# Patient Record
Sex: FEMALE | Race: OTHER
[De-identification: names, ages, dates, MRNs, and addresses within clinical notes are randomized per-mention and may not be internally consistent; named-entity substitution may affect disease eponyms.]

---

## 2023-05-15 ENCOUNTER — OFFICE VISIT (OUTPATIENT)
Dept: FAMILY MEDICINE CLINIC | Facility: CLINIC | Age: 28
End: 2023-05-15
Payer: COMMERCIAL

## 2023-05-15 VITALS
WEIGHT: 293 LBS | DIASTOLIC BLOOD PRESSURE: 84 MMHG | HEIGHT: 66 IN | BODY MASS INDEX: 47.09 KG/M2 | RESPIRATION RATE: 16 BRPM | SYSTOLIC BLOOD PRESSURE: 122 MMHG

## 2023-05-15 DIAGNOSIS — B00.9 HERPES: ICD-10-CM

## 2023-05-15 DIAGNOSIS — L73.2 HIDRADENITIS SUPPURATIVA: Primary | ICD-10-CM

## 2023-05-15 DIAGNOSIS — Z11.59 NEED FOR HEPATITIS C SCREENING TEST: ICD-10-CM

## 2023-05-15 DIAGNOSIS — M54.50 CHRONIC BILATERAL LOW BACK PAIN WITHOUT SCIATICA: ICD-10-CM

## 2023-05-15 DIAGNOSIS — G89.29 CHRONIC BILATERAL LOW BACK PAIN WITHOUT SCIATICA: ICD-10-CM

## 2023-05-15 DIAGNOSIS — Z72.51 HIGH RISK HETEROSEXUAL BEHAVIOR: ICD-10-CM

## 2023-05-15 DIAGNOSIS — Z13.220 SCREENING CHOLESTEROL LEVEL: ICD-10-CM

## 2023-05-15 DIAGNOSIS — N95.9 PREMENOPAUSAL PATIENT: ICD-10-CM

## 2023-05-15 DIAGNOSIS — M99.05 SOMATIC DYSFUNCTION OF PELVIC REGION: ICD-10-CM

## 2023-05-15 DIAGNOSIS — Z13.1 SCREENING FOR DIABETES MELLITUS: ICD-10-CM

## 2023-05-15 DIAGNOSIS — M99.04 SOMATIC DYSFUNCTION OF SACRAL REGION: ICD-10-CM

## 2023-05-15 LAB
BASOPHILS # BLD: 0.1 K/UL (ref 0–0.2)
BASOPHILS NFR BLD: 0 % (ref 0–2)
DIFFERENTIAL METHOD BLD: ABNORMAL
EOSINOPHIL # BLD: 0.2 K/UL (ref 0–0.8)
EOSINOPHIL NFR BLD: 1 % (ref 0.5–7.8)
ERYTHROCYTE [DISTWIDTH] IN BLOOD BY AUTOMATED COUNT: 14.8 % (ref 11.9–14.6)
HCT VFR BLD AUTO: 36.6 % (ref 35.8–46.3)
HGB BLD-MCNC: 11 G/DL (ref 11.7–15.4)
IMM GRANULOCYTES # BLD AUTO: 0.1 K/UL (ref 0–0.5)
IMM GRANULOCYTES NFR BLD AUTO: 0 % (ref 0–5)
LYMPHOCYTES # BLD: 4.3 K/UL (ref 0.5–4.6)
LYMPHOCYTES NFR BLD: 31 % (ref 13–44)
MCH RBC QN AUTO: 25.2 PG (ref 26.1–32.9)
MCHC RBC AUTO-ENTMCNC: 30.1 G/DL (ref 31.4–35)
MCV RBC AUTO: 83.9 FL (ref 82–102)
MONOCYTES # BLD: 0.8 K/UL (ref 0.1–1.3)
MONOCYTES NFR BLD: 6 % (ref 4–12)
NEUTS SEG # BLD: 8.5 K/UL (ref 1.7–8.2)
NEUTS SEG NFR BLD: 62 % (ref 43–78)
NRBC # BLD: 0 K/UL (ref 0–0.2)
PLATELET # BLD AUTO: 375 K/UL (ref 150–450)
PMV BLD AUTO: 11.3 FL (ref 9.4–12.3)
RBC # BLD AUTO: 4.36 M/UL (ref 4.05–5.2)
WBC # BLD AUTO: 13.9 K/UL (ref 4.3–11.1)

## 2023-05-15 PROCEDURE — 99204 OFFICE O/P NEW MOD 45 MIN: CPT | Performed by: FAMILY MEDICINE

## 2023-05-15 PROCEDURE — 98925 OSTEOPATH MANJ 1-2 REGIONS: CPT | Performed by: FAMILY MEDICINE

## 2023-05-15 RX ORDER — CLINDAMYCIN PHOSPHATE 10 MG/G
GEL TOPICAL
Qty: 60 G | Refills: 1 | Status: SHIPPED | OUTPATIENT
Start: 2023-05-15 | End: 2023-05-22

## 2023-05-15 RX ORDER — VALACYCLOVIR HYDROCHLORIDE 500 MG/1
500 TABLET, FILM COATED ORAL DAILY
COMMUNITY
Start: 2023-04-16

## 2023-05-15 SDOH — ECONOMIC STABILITY: FOOD INSECURITY: WITHIN THE PAST 12 MONTHS, THE FOOD YOU BOUGHT JUST DIDN'T LAST AND YOU DIDN'T HAVE MONEY TO GET MORE.: NEVER TRUE

## 2023-05-15 SDOH — ECONOMIC STABILITY: INCOME INSECURITY: HOW HARD IS IT FOR YOU TO PAY FOR THE VERY BASICS LIKE FOOD, HOUSING, MEDICAL CARE, AND HEATING?: NOT HARD AT ALL

## 2023-05-15 SDOH — ECONOMIC STABILITY: HOUSING INSECURITY
IN THE LAST 12 MONTHS, WAS THERE A TIME WHEN YOU DID NOT HAVE A STEADY PLACE TO SLEEP OR SLEPT IN A SHELTER (INCLUDING NOW)?: NO

## 2023-05-15 SDOH — ECONOMIC STABILITY: FOOD INSECURITY: WITHIN THE PAST 12 MONTHS, YOU WORRIED THAT YOUR FOOD WOULD RUN OUT BEFORE YOU GOT MONEY TO BUY MORE.: NEVER TRUE

## 2023-05-15 ASSESSMENT — ENCOUNTER SYMPTOMS
NAUSEA: 0
DIARRHEA: 0
COUGH: 0
CONSTIPATION: 0
SHORTNESS OF BREATH: 0
ABDOMINAL PAIN: 0
VOMITING: 0

## 2023-05-15 ASSESSMENT — PATIENT HEALTH QUESTIONNAIRE - PHQ9
SUM OF ALL RESPONSES TO PHQ QUESTIONS 1-9: 0
1. LITTLE INTEREST OR PLEASURE IN DOING THINGS: 0
SUM OF ALL RESPONSES TO PHQ QUESTIONS 1-9: 0
2. FEELING DOWN, DEPRESSED OR HOPELESS: 0
SUM OF ALL RESPONSES TO PHQ9 QUESTIONS 1 & 2: 0
SUM OF ALL RESPONSES TO PHQ QUESTIONS 1-9: 0
SUM OF ALL RESPONSES TO PHQ QUESTIONS 1-9: 0

## 2023-05-15 NOTE — PROGRESS NOTES
PROGRESS NOTE    SUBJECTIVE:   Stanley Owens is a 33 y.o. female seen for a follow up visit regarding [unfilled]    34 y/o female here to establish care. She has Pmh of herpes. She would like checked for other STD's because of a questionable partner that she got the herpes from. She is due for a pap. She has abscesses on her breasts and under her arms since she was 13. Her back and hips have been hurting her. She sat in a chair for a long time and feels very \"uneven\". Past Medical History, Past Surgical History, Family history, Social History, and Medications were all reviewed with the patient today and updated as necessary. Current Outpatient Medications   Medication Sig Dispense Refill    valACYclovir (VALTREX) 500 MG tablet Take 1 tablet by mouth daily      metFORMIN (GLUCOPHAGE) 500 MG tablet Take 1 tablet by mouth 2 times daily (with meals) 180 tablet 1    clindamycin (CLEOCIN-T) 1 % gel Apply topically 2 times daily. 60 g 1     No current facility-administered medications for this visit. No Known Allergies  There is no problem list on file for this patient. Past Medical History:   Diagnosis Date    Herpes      Past Surgical History:   Procedure Laterality Date    BRAIN SURGERY       Social History     Tobacco Use    Smoking status: Former     Types: Cigarettes     Passive exposure: Never    Smokeless tobacco: Never   Substance Use Topics    Alcohol use: Never         Review of Systems   Constitutional:  Negative for chills, fatigue and fever. Respiratory:  Negative for cough and shortness of breath. Cardiovascular:  Negative for chest pain and palpitations. Gastrointestinal:  Negative for abdominal pain, constipation, diarrhea, nausea and vomiting. Musculoskeletal:         Low back and hip pain, right   Skin:         Abscesses on breasts and axillas. Neurological:  Negative for dizziness and headaches. Psychiatric/Behavioral:  Negative for sleep disturbance.  The patient is not

## 2023-05-16 LAB
ALBUMIN SERPL-MCNC: 3.6 G/DL (ref 3.5–5)
ALBUMIN/GLOB SERPL: 0.9 (ref 0.4–1.6)
ALP SERPL-CCNC: 107 U/L (ref 50–136)
ALT SERPL-CCNC: 16 U/L (ref 12–65)
ANION GAP SERPL CALC-SCNC: 3 MMOL/L (ref 2–11)
AST SERPL-CCNC: 9 U/L (ref 15–37)
BILIRUB SERPL-MCNC: 0.3 MG/DL (ref 0.2–1.1)
BUN SERPL-MCNC: 18 MG/DL (ref 6–23)
CALCIUM SERPL-MCNC: 9.4 MG/DL (ref 8.3–10.4)
CHLORIDE SERPL-SCNC: 105 MMOL/L (ref 101–110)
CHOLEST SERPL-MCNC: 208 MG/DL
CO2 SERPL-SCNC: 27 MMOL/L (ref 21–32)
CREAT SERPL-MCNC: 0.9 MG/DL (ref 0.6–1)
GLOBULIN SER CALC-MCNC: 3.9 G/DL (ref 2.8–4.5)
GLUCOSE SERPL-MCNC: 83 MG/DL (ref 65–100)
HCV RNA, QN (IU/ML): NORMAL IU/ML
HDLC SERPL-MCNC: 36 MG/DL (ref 40–60)
HDLC SERPL: 5.8
HIV 1+2 AB+HIV1 P24 AG SERPL QL IA: NONREACTIVE
HIV 1/2 RESULT COMMENT: NORMAL
LDLC SERPL CALC-MCNC: 117 MG/DL
POTASSIUM SERPL-SCNC: 4.4 MMOL/L (ref 3.5–5.1)
PROT SERPL-MCNC: 7.5 G/DL (ref 6.3–8.2)
SODIUM SERPL-SCNC: 135 MMOL/L (ref 133–143)
TEST INFORMATION: NORMAL
TRIGL SERPL-MCNC: 275 MG/DL (ref 35–150)
VLDLC SERPL CALC-MCNC: 55 MG/DL (ref 6–23)

## 2023-05-23 ENCOUNTER — OFFICE VISIT (OUTPATIENT)
Dept: FAMILY MEDICINE CLINIC | Facility: CLINIC | Age: 28
End: 2023-05-23
Payer: COMMERCIAL

## 2023-05-23 VITALS
SYSTOLIC BLOOD PRESSURE: 122 MMHG | HEIGHT: 66 IN | BODY MASS INDEX: 47.09 KG/M2 | WEIGHT: 293 LBS | DIASTOLIC BLOOD PRESSURE: 84 MMHG | RESPIRATION RATE: 16 BRPM

## 2023-05-23 DIAGNOSIS — A63.0 GENITAL WARTS: ICD-10-CM

## 2023-05-23 DIAGNOSIS — Z12.4 SCREENING FOR CERVICAL CANCER: Primary | ICD-10-CM

## 2023-05-23 DIAGNOSIS — Z72.51 HIGH RISK HETEROSEXUAL BEHAVIOR: ICD-10-CM

## 2023-05-23 PROCEDURE — 99395 PREV VISIT EST AGE 18-39: CPT | Performed by: FAMILY MEDICINE

## 2023-05-23 PROCEDURE — 99213 OFFICE O/P EST LOW 20 MIN: CPT | Performed by: FAMILY MEDICINE

## 2023-05-23 RX ORDER — IMIQUIMOD 12.5 MG/.25G
CREAM TOPICAL
Qty: 24 EACH | Refills: 1 | Status: SHIPPED | OUTPATIENT
Start: 2023-05-23 | End: 2023-05-30

## 2023-05-23 RX ORDER — CEPHALEXIN 500 MG/1
500 CAPSULE ORAL 3 TIMES DAILY
Qty: 21 CAPSULE | Refills: 0 | Status: SHIPPED | OUTPATIENT
Start: 2023-05-23

## 2023-05-23 ASSESSMENT — PATIENT HEALTH QUESTIONNAIRE - PHQ9
SUM OF ALL RESPONSES TO PHQ QUESTIONS 1-9: 0
1. LITTLE INTEREST OR PLEASURE IN DOING THINGS: 0
SUM OF ALL RESPONSES TO PHQ QUESTIONS 1-9: 0
SUM OF ALL RESPONSES TO PHQ QUESTIONS 1-9: 0
2. FEELING DOWN, DEPRESSED OR HOPELESS: 0
SUM OF ALL RESPONSES TO PHQ QUESTIONS 1-9: 0
SUM OF ALL RESPONSES TO PHQ9 QUESTIONS 1 & 2: 0

## 2023-05-23 NOTE — PROGRESS NOTES
Patient is a 33 y.o. female for annual routine Pap and checkup. HISTORY:    G 0 P 0 Ab 0 #SVDs 0 #C/S 0  Patient's last menstrual period was 05/15/2023 (exact date). Tobacco: none Alcohol:  none  Marital Status: single  Last Pap:  2021  Mammo:  N/A DEXA:  N/A Colonoscopy: N/A    There is no problem list on file for this patient. Past Medical History:   Diagnosis Date    Herpes      Past Surgical History:   Procedure Laterality Date    BRAIN SURGERY       No family history on file. No Known Allergies  Current Outpatient Medications on File Prior to Visit   Medication Sig Dispense Refill    valACYclovir (VALTREX) 500 MG tablet Take 1 tablet by mouth daily      metFORMIN (GLUCOPHAGE) 500 MG tablet Take 1 tablet by mouth 2 times daily (with meals) 180 tablet 1     No current facility-administered medications on file prior to visit. Sexual History:  not sexually active for 9 months, but previously had high risk sexual encounters  Contraception:  none    ROS:  Feeling well. No dyspnea or chest pain on exertion. No abdominal pain, change in bowel habits, black or bloody stools. No urinary tract symptoms. GYN ROS: normal menses, no abnormal bleeding, pelvic pain or discharge, no breast pain or new or enlarging lumps on self exam, she complains of infection on right breat. Warts on labia. No neurological complaints. PHYSICAL EXAM:  Blood pressure 122/84, resp. rate 16, height 5' 6\" (1.676 m), weight (!) 311 lb 6.4 oz (141.3 kg), last menstrual period 05/15/2023. The patient appears well, alert, oriented x 3, in no distress. Abdomen soft without tenderness, guarding, mass or organomegaly. Extremities show no edema, normal peripheral pulses. Neurological is normal, no focal findings. Breast[de-identified] symmetric fibrous changes bilaterally, one abscess on right breast    Pelvic:  normal, vulva, vagina, cervix, uterus and adnexa. Genital warts on right labia.      ASSESSMENT:  well woman  Encounter Diagnoses

## 2023-05-27 LAB
C TRACH RRNA SPEC QL NAA+PROBE: POSITIVE
N GONORRHOEA RRNA SPEC QL NAA+PROBE: NEGATIVE
SPECIMEN SOURCE: ABNORMAL
T VAGINALIS RRNA SPEC QL NAA+PROBE: NEGATIVE

## 2023-05-31 RX ORDER — DOXYCYCLINE HYCLATE 100 MG
100 TABLET ORAL 2 TIMES DAILY
Qty: 14 TABLET | Refills: 0 | Status: SHIPPED | OUTPATIENT
Start: 2023-05-31 | End: 2023-06-07

## 2023-06-02 LAB
CYTOLOGIST CVX/VAG CYTO: ABNORMAL
CYTOLOGY CVX/VAG DOC THIN PREP: ABNORMAL
HPV APTIMA: POSITIVE
HPV GENOTYPE REFLEX: ABNORMAL
Lab: ABNORMAL
PATH REPORT.FINAL DX SPEC: ABNORMAL
PATHOLOGIST CVX/VAG CYTO: ABNORMAL
PATHOLOGIST PROVIDED ICD: ABNORMAL
RECOM F/U CVX/VAG CYTO: ABNORMAL
STAT OF ADQ CVX/VAG CYTO-IMP: ABNORMAL

## 2023-06-08 DIAGNOSIS — R87.810 ASCUS WITH POSITIVE HIGH RISK HPV CERVICAL: Primary | ICD-10-CM

## 2023-06-08 DIAGNOSIS — R87.610 ASCUS WITH POSITIVE HIGH RISK HPV CERVICAL: Primary | ICD-10-CM

## 2024-05-13 ENCOUNTER — OFFICE VISIT (OUTPATIENT)
Dept: INTERNAL MEDICINE CLINIC | Facility: CLINIC | Age: 29
End: 2024-05-13
Payer: COMMERCIAL

## 2024-05-13 ENCOUNTER — TELEPHONE (OUTPATIENT)
Dept: INTERNAL MEDICINE CLINIC | Facility: CLINIC | Age: 29
End: 2024-05-13

## 2024-05-13 VITALS
HEART RATE: 95 BPM | TEMPERATURE: 97.7 F | HEIGHT: 66 IN | WEIGHT: 293 LBS | OXYGEN SATURATION: 100 % | SYSTOLIC BLOOD PRESSURE: 127 MMHG | BODY MASS INDEX: 47.09 KG/M2 | DIASTOLIC BLOOD PRESSURE: 78 MMHG

## 2024-05-13 DIAGNOSIS — N92.0 MENORRHAGIA WITH REGULAR CYCLE: ICD-10-CM

## 2024-05-13 DIAGNOSIS — Z86.19 HX OF HERPES GENITALIS: ICD-10-CM

## 2024-05-13 DIAGNOSIS — Z76.89 ESTABLISHING CARE WITH NEW DOCTOR, ENCOUNTER FOR: Primary | ICD-10-CM

## 2024-05-13 DIAGNOSIS — E66.01 CLASS 3 SEVERE OBESITY DUE TO EXCESS CALORIES WITH SERIOUS COMORBIDITY AND BODY MASS INDEX (BMI) OF 50.0 TO 59.9 IN ADULT (HCC): ICD-10-CM

## 2024-05-13 DIAGNOSIS — E78.2 MIXED HYPERLIPIDEMIA: ICD-10-CM

## 2024-05-13 DIAGNOSIS — E55.9 VITAMIN D DEFICIENCY: ICD-10-CM

## 2024-05-13 DIAGNOSIS — E61.1 IRON DEFICIENCY: ICD-10-CM

## 2024-05-13 DIAGNOSIS — E28.2 PCOS (POLYCYSTIC OVARIAN SYNDROME): ICD-10-CM

## 2024-05-13 DIAGNOSIS — D64.9 NORMOCYTIC ANEMIA: ICD-10-CM

## 2024-05-13 DIAGNOSIS — D50.9 NORMOCYTIC HYPOCHROMIC ANEMIA: ICD-10-CM

## 2024-05-13 PROBLEM — B00.9 HERPES SIMPLEX VIRUS (HSV) INFECTION: Status: ACTIVE | Noted: 2023-03-20

## 2024-05-13 PROBLEM — R03.0 ELEVATED BLOOD-PRESSURE READING, WITHOUT DIAGNOSIS OF HYPERTENSION: Status: ACTIVE | Noted: 2024-03-15

## 2024-05-13 PROBLEM — E66.813 CLASS 3 SEVERE OBESITY DUE TO EXCESS CALORIES WITH SERIOUS COMORBIDITY AND BODY MASS INDEX (BMI) OF 50.0 TO 59.9 IN ADULT: Status: ACTIVE | Noted: 2024-05-13

## 2024-05-13 PROBLEM — A60.00 RECURRENT GENITAL HERPES SIMPLEX: Status: ACTIVE | Noted: 2024-01-12

## 2024-05-13 LAB
25(OH)D3 SERPL-MCNC: 11.2 NG/ML (ref 30–100)
ALBUMIN SERPL-MCNC: 3.8 G/DL (ref 3.5–5)
ALBUMIN/GLOB SERPL: 1.1 (ref 1–1.9)
ALP SERPL-CCNC: 94 U/L (ref 35–104)
ALT SERPL-CCNC: 9 U/L (ref 12–65)
ANION GAP SERPL CALC-SCNC: 11 MMOL/L (ref 9–18)
AST SERPL-CCNC: 15 U/L (ref 15–37)
BILIRUB SERPL-MCNC: 0.2 MG/DL (ref 0–1.2)
BUN SERPL-MCNC: 24 MG/DL (ref 6–23)
CALCIUM SERPL-MCNC: 9.2 MG/DL (ref 8.8–10.2)
CHLORIDE SERPL-SCNC: 102 MMOL/L (ref 98–107)
CHOLEST SERPL-MCNC: 212 MG/DL (ref 0–200)
CO2 SERPL-SCNC: 25 MMOL/L (ref 20–28)
CREAT SERPL-MCNC: 0.72 MG/DL (ref 0.6–1.1)
ERYTHROCYTE [DISTWIDTH] IN BLOOD BY AUTOMATED COUNT: 14.5 % (ref 11.9–14.6)
EST. AVERAGE GLUCOSE BLD GHB EST-MCNC: 106 MG/DL
FERRITIN SERPL-MCNC: 19 NG/ML (ref 8–388)
GLOBULIN SER CALC-MCNC: 3.5 G/DL (ref 2.3–3.5)
GLUCOSE SERPL-MCNC: 87 MG/DL (ref 70–99)
HBA1C MFR BLD: 5.3 % (ref 0–5.6)
HCT VFR BLD AUTO: 35.3 % (ref 35.8–46.3)
HDLC SERPL-MCNC: 39 MG/DL (ref 40–60)
HDLC SERPL: 5.4 (ref 0–5)
HGB BLD-MCNC: 10.5 G/DL (ref 11.7–15.4)
IRON SERPL-MCNC: 29 UG/DL (ref 35–100)
LDLC SERPL CALC-MCNC: 132 MG/DL (ref 0–100)
MCH RBC QN AUTO: 24.6 PG (ref 26.1–32.9)
MCHC RBC AUTO-ENTMCNC: 29.7 G/DL (ref 31.4–35)
MCV RBC AUTO: 82.9 FL (ref 82–102)
NRBC # BLD: 0 K/UL (ref 0–0.2)
PLATELET # BLD AUTO: 344 K/UL (ref 150–450)
PMV BLD AUTO: 11.2 FL (ref 9.4–12.3)
POTASSIUM SERPL-SCNC: 4.1 MMOL/L (ref 3.5–5.1)
PROT SERPL-MCNC: 7.3 G/DL (ref 6.3–8.2)
RBC # BLD AUTO: 4.26 M/UL (ref 4.05–5.2)
SODIUM SERPL-SCNC: 138 MMOL/L (ref 136–145)
TRIGL SERPL-MCNC: 203 MG/DL (ref 0–150)
TSH W FREE THYROID IF ABNORMAL: 1.67 UIU/ML (ref 0.27–4.2)
VLDLC SERPL CALC-MCNC: 41 MG/DL (ref 6–23)
WBC # BLD AUTO: 13.6 K/UL (ref 4.3–11.1)

## 2024-05-13 PROCEDURE — G2211 COMPLEX E/M VISIT ADD ON: HCPCS | Performed by: INTERNAL MEDICINE

## 2024-05-13 PROCEDURE — 99214 OFFICE O/P EST MOD 30 MIN: CPT | Performed by: INTERNAL MEDICINE

## 2024-05-13 RX ORDER — VALACYCLOVIR HYDROCHLORIDE 1 G/1
1000 TABLET, FILM COATED ORAL DAILY
Qty: 90 TABLET | Refills: 1 | Status: SHIPPED | OUTPATIENT
Start: 2024-05-13 | End: 2024-05-13 | Stop reason: SDUPTHER

## 2024-05-13 RX ORDER — BUTYROSPERMUM PARKII(SHEA BUTTER), SIMMONDSIA CHINENSIS (JOJOBA) SEED OIL, ALOE BARBADENSIS LEAF EXTRACT .01; 1; 3.5 G/100G; G/100G; G/100G
5000 LIQUID TOPICAL DAILY
Qty: 90 CAPSULE | Refills: 5 | Status: SHIPPED | OUTPATIENT
Start: 2024-05-13 | End: 2024-05-13 | Stop reason: SDUPTHER

## 2024-05-13 ASSESSMENT — PATIENT HEALTH QUESTIONNAIRE - PHQ9
SUM OF ALL RESPONSES TO PHQ QUESTIONS 1-9: 0
1. LITTLE INTEREST OR PLEASURE IN DOING THINGS: NOT AT ALL
SUM OF ALL RESPONSES TO PHQ QUESTIONS 1-9: 0
2. FEELING DOWN, DEPRESSED OR HOPELESS: NOT AT ALL
SUM OF ALL RESPONSES TO PHQ QUESTIONS 1-9: 0
SUM OF ALL RESPONSES TO PHQ QUESTIONS 1-9: 0
SUM OF ALL RESPONSES TO PHQ9 QUESTIONS 1 & 2: 0

## 2024-05-13 NOTE — TELEPHONE ENCOUNTER
The pt called stating she was seen today by Dr. Rivera.  She notes he sent all of her Rxs to [facility], but they are out of network and her Rxs were going to cost her over a $1000.  She would like to see if these can be resent to [facility] [address].   Pts CB# [phone]

## 2024-05-13 NOTE — ACP (ADVANCE CARE PLANNING)
Advance Care Planning   The patient has the following advanced directives on file:  Advance Directives       Power of  Living Will ACP-Advance Directive ACP-Power of     Not on File Not on File Not on File Not on File            The patient has appointed the following active healthcare agents:    Primary Decision Maker: Sana Campbell - Brother/Sister - [phone]    The Patient has the following current code status:    Code Status: Not on file    Visit Documentation:  I discussed Advance Care Planning with Zaira Delgado today which included the importance of making their choices for care and treatment in the case of a health event that adversely affects their decision-making abilities. She has not completed the Advance Care Directives. She does not have an active health care agent at this time.  Zaira Delgado was encouraged to complete the declaration forms and provide a signed copy of her medical records.  I advised patient we would continue this discussion at future visits.     Falguni Walsh  5/13/2024

## 2024-05-13 NOTE — PROGRESS NOTES
Zaira Delgado (: 1995) is a 29 y.o. female, here for evaluation of the following chief complaint(s):  Established New Doctor (Pt is here to estab PCP care. Pt is transferring from Dr. Ramos.), Weight Management (Pt would like to discuss weight loss options.), and Herpes Zoster (Pt states she's been dx x 2 years and would like to discuss treatment.)       ASSESSMENT/PLAN:  1. Establishing care with new doctor, encounter for  2. Mixed hyperlipidemia  -     Comprehensive Metabolic Panel; Future  -     Lipid Panel; Future  3. Normocytic anemia  -     Comprehensive Metabolic Panel; Future  -     CBC; Future  -     Iron; Future  -     Ferritin; Future  4. Vitamin D deficiency  -     Vitamin D 25 Hydroxy; Future  -     vitamin D (D3 5000) 125 MCG (5000 UT) CAPS capsule; Take 1 capsule by mouth daily, Disp-90 capsule, R-5Normal  5. Class 3 severe obesity due to excess calories with serious comorbidity and body mass index (BMI) of 50.0 to 59.9 in adult (HCC)  -     TSH with Reflex; Future  -     Hemoglobin A1C; Future  -     [facility]  -     Tirzepatide-Weight Management 2.5 MG/0.5ML SOAJ; Inject 2.5 mg into the skin once a week, Disp-0.5 mL, R-1Normal  -     Tirzepatide-Weight Management 2.5 MG/0.5ML SOAJ; Inject 5 mg into the skin once a week, Disp-0.5 mL, R-1Normal  6. PCOS (polycystic ovarian syndrome)  Assessment & Plan:   Previously treated with metformin.  Orders:  -     [facility]  7. Hx of herpes genitalis  Assessment & Plan:   1 outbreak last year. Triggered with increased stress. Not responding well to Valtrex 500 or 1 g dosing. Outbreaks are lasting weeks at a time.   Orders:  -     [facility]  -     valACYclovir (VALTREX) 1 g tablet; Take 1 tablet by mouth daily, Disp-90 tablet, R-1Normal  8. Menorrhagia with regular cycle             SUBJECTIVE/OBJECTIVE:  HPI: Patient is a pleasant 29-year-old

## 2024-05-13 NOTE — ASSESSMENT & PLAN NOTE
1 outbreak last year. Triggered with increased stress. Not responding well to Valtrex 500 or 1 g dosing. Outbreaks are lasting weeks at a time.

## 2024-05-14 RX ORDER — BUTYROSPERMUM PARKII(SHEA BUTTER), SIMMONDSIA CHINENSIS (JOJOBA) SEED OIL, ALOE BARBADENSIS LEAF EXTRACT .01; 1; 3.5 G/100G; G/100G; G/100G
5000 LIQUID TOPICAL DAILY
Qty: 90 CAPSULE | Refills: 5 | Status: SHIPPED | OUTPATIENT
Start: 2024-05-14

## 2024-05-14 RX ORDER — FERROUS SULFATE 325(65) MG
325 TABLET ORAL EVERY OTHER DAY
Qty: 90 TABLET | Refills: 1 | Status: SHIPPED | OUTPATIENT
Start: 2024-05-14

## 2024-05-14 RX ORDER — VALACYCLOVIR HYDROCHLORIDE 1 G/1
1000 TABLET, FILM COATED ORAL DAILY
Qty: 90 TABLET | Refills: 1 | Status: SHIPPED | OUTPATIENT
Start: 2024-05-14

## 2024-05-14 RX ORDER — TIRZEPATIDE 5 MG/.5ML
0.5 INJECTION, SOLUTION SUBCUTANEOUS
Qty: 0.5 ML | Refills: 2 | Status: SHIPPED | OUTPATIENT
Start: 2024-05-14 | End: 2024-06-13

## 2024-12-31 SDOH — ECONOMIC STABILITY: FOOD INSECURITY: WITHIN THE PAST 12 MONTHS, YOU WORRIED THAT YOUR FOOD WOULD RUN OUT BEFORE YOU GOT MONEY TO BUY MORE.: SOMETIMES TRUE

## 2024-12-31 SDOH — ECONOMIC STABILITY: FOOD INSECURITY: WITHIN THE PAST 12 MONTHS, THE FOOD YOU BOUGHT JUST DIDN'T LAST AND YOU DIDN'T HAVE MONEY TO GET MORE.: NEVER TRUE

## 2024-12-31 SDOH — ECONOMIC STABILITY: TRANSPORTATION INSECURITY
IN THE PAST 12 MONTHS, HAS LACK OF TRANSPORTATION KEPT YOU FROM MEETINGS, WORK, OR FROM GETTING THINGS NEEDED FOR DAILY LIVING?: NO

## 2024-12-31 SDOH — ECONOMIC STABILITY: INCOME INSECURITY: HOW HARD IS IT FOR YOU TO PAY FOR THE VERY BASICS LIKE FOOD, HOUSING, MEDICAL CARE, AND HEATING?: NOT VERY HARD

## 2025-01-03 ENCOUNTER — OFFICE VISIT (OUTPATIENT)
Dept: FAMILY MEDICINE CLINIC | Facility: CLINIC | Age: 30
End: 2025-01-03

## 2025-01-03 VITALS
RESPIRATION RATE: 16 BRPM | HEIGHT: 66 IN | WEIGHT: 293 LBS | DIASTOLIC BLOOD PRESSURE: 80 MMHG | SYSTOLIC BLOOD PRESSURE: 128 MMHG | BODY MASS INDEX: 47.09 KG/M2

## 2025-01-03 DIAGNOSIS — M99.05 SOMATIC DYSFUNCTION OF PELVIC REGION: ICD-10-CM

## 2025-01-03 DIAGNOSIS — E04.1 LEFT THYROID NODULE: ICD-10-CM

## 2025-01-03 DIAGNOSIS — G89.29 CHRONIC BILATERAL LOW BACK PAIN WITHOUT SCIATICA: Primary | ICD-10-CM

## 2025-01-03 DIAGNOSIS — M54.50 CHRONIC BILATERAL LOW BACK PAIN WITHOUT SCIATICA: Primary | ICD-10-CM

## 2025-01-03 DIAGNOSIS — Z86.19 HX OF HERPES GENITALIS: ICD-10-CM

## 2025-01-03 DIAGNOSIS — M99.04 SOMATIC DYSFUNCTION OF SACRAL REGION: ICD-10-CM

## 2025-01-03 RX ORDER — NALTREXONE HYDROCHLORIDE 50 MG/1
TABLET, FILM COATED ORAL
COMMUNITY
Start: 2024-11-01

## 2025-01-03 RX ORDER — BUPROPION HYDROCHLORIDE 200 MG/1
200 TABLET, EXTENDED RELEASE ORAL 2 TIMES DAILY
COMMUNITY
Start: 2024-12-16

## 2025-01-03 RX ORDER — ESCITALOPRAM OXALATE 10 MG/1
10 TABLET ORAL DAILY
COMMUNITY
Start: 2024-12-18

## 2025-01-03 RX ORDER — VALACYCLOVIR HYDROCHLORIDE 1 G/1
1000 TABLET, FILM COATED ORAL DAILY
Qty: 90 TABLET | Refills: 1 | Status: SHIPPED | OUTPATIENT
Start: 2025-01-03

## 2025-01-03 ASSESSMENT — PATIENT HEALTH QUESTIONNAIRE - PHQ9
SUM OF ALL RESPONSES TO PHQ QUESTIONS 1-9: 1
1. LITTLE INTEREST OR PLEASURE IN DOING THINGS: NOT AT ALL
2. FEELING DOWN, DEPRESSED OR HOPELESS: SEVERAL DAYS
SUM OF ALL RESPONSES TO PHQ9 QUESTIONS 1 & 2: 1
SUM OF ALL RESPONSES TO PHQ QUESTIONS 1-9: 1

## 2025-01-20 ENCOUNTER — TELEPHONE (OUTPATIENT)
Dept: FAMILY MEDICINE CLINIC | Facility: CLINIC | Age: 30
End: 2025-01-20

## 2025-01-20 NOTE — TELEPHONE ENCOUNTER
Physical Therapy called and stated they had not received back plan of care that was sent to us on 12/06/24. Fax # [phone] and cb # [phone]

## 2025-01-20 NOTE — TELEPHONE ENCOUNTER
Returned call to advise that we do not have this document. She will resend it over to us, I will have Dr. Ramos sign and fax back over to her.

## 2025-08-05 ENCOUNTER — OFFICE VISIT (OUTPATIENT)
Dept: FAMILY MEDICINE CLINIC | Facility: CLINIC | Age: 30
End: 2025-08-05
Payer: COMMERCIAL

## 2025-08-05 ENCOUNTER — TELEPHONE (OUTPATIENT)
Dept: FAMILY MEDICINE CLINIC | Facility: CLINIC | Age: 30
End: 2025-08-05

## 2025-08-05 VITALS
BODY MASS INDEX: 47.09 KG/M2 | WEIGHT: 293 LBS | OXYGEN SATURATION: 99 % | SYSTOLIC BLOOD PRESSURE: 142 MMHG | HEART RATE: 107 BPM | HEIGHT: 66 IN | DIASTOLIC BLOOD PRESSURE: 100 MMHG

## 2025-08-05 DIAGNOSIS — D72.820 LYMPHOCYTOSIS: ICD-10-CM

## 2025-08-05 DIAGNOSIS — R00.2 PALPITATIONS: Primary | ICD-10-CM

## 2025-08-05 DIAGNOSIS — Z72.51 HIGH RISK HETEROSEXUAL BEHAVIOR: ICD-10-CM

## 2025-08-05 LAB
BASOPHILS # BLD: 0.06 K/UL (ref 0–0.2)
BASOPHILS NFR BLD: 0.5 % (ref 0–2)
BILIRUBIN, URINE, POC: NEGATIVE
BLOOD URINE, POC: NEGATIVE
DIFFERENTIAL METHOD BLD: ABNORMAL
EOSINOPHIL # BLD: 0.26 K/UL (ref 0–0.8)
EOSINOPHIL NFR BLD: 2 % (ref 0.5–7.8)
ERYTHROCYTE [DISTWIDTH] IN BLOOD BY AUTOMATED COUNT: 15 % (ref 11.9–14.6)
GLUCOSE URINE, POC: NEGATIVE
HBV SURFACE AB SERPL IA-ACNC: 7165 MIU/ML
HCT VFR BLD AUTO: 34.6 % (ref 35.8–46.3)
HGB BLD-MCNC: 10.1 G/DL (ref 11.7–15.4)
HIV 1+2 AB+HIV1 P24 AG SERPL QL IA: NONREACTIVE
HIV 1/2 RESULT COMMENT: NORMAL
IMM GRANULOCYTES # BLD AUTO: 0.08 K/UL (ref 0–0.5)
IMM GRANULOCYTES NFR BLD AUTO: 0.6 % (ref 0–5)
KETONES, URINE, POC: NEGATIVE
LEUKOCYTE ESTERASE, URINE, POC: NEGATIVE
LYMPHOCYTES # BLD: 4.51 K/UL (ref 0.5–4.6)
LYMPHOCYTES NFR BLD: 33.8 % (ref 13–44)
MCH RBC QN AUTO: 24.3 PG (ref 26.1–32.9)
MCHC RBC AUTO-ENTMCNC: 29.2 G/DL (ref 31.4–35)
MCV RBC AUTO: 83.4 FL (ref 82–102)
MONOCYTES # BLD: 0.7 K/UL (ref 0.1–1.3)
MONOCYTES NFR BLD: 5.3 % (ref 4–12)
NEUTS SEG # BLD: 7.72 K/UL (ref 1.7–8.2)
NEUTS SEG NFR BLD: 57.8 % (ref 43–78)
NITRITE, URINE, POC: NEGATIVE
NRBC # BLD: 0 K/UL (ref 0–0.2)
PH, URINE, POC: 6 (ref 4.6–8)
PLATELET # BLD AUTO: 338 K/UL (ref 150–450)
PMV BLD AUTO: 10.4 FL (ref 9.4–12.3)
PROTEIN,URINE, POC: 30
RBC # BLD AUTO: 4.15 M/UL (ref 4.05–5.2)
SPECIFIC GRAVITY, URINE, POC: 1.03 (ref 1–1.03)
T PALLIDUM AB SER QL IA: NONREACTIVE
T4 FREE SERPL-MCNC: 1 NG/DL (ref 0.9–1.7)
TSH W FREE THYROID IF ABNORMAL: 4.99 UIU/ML (ref 0.27–4.2)
URINALYSIS CLARITY, POC: CLEAR
URINALYSIS COLOR, POC: YELLOW
UROBILINOGEN, POC: ABNORMAL
WBC # BLD AUTO: 13.3 K/UL (ref 4.3–11.1)

## 2025-08-05 PROCEDURE — 99214 OFFICE O/P EST MOD 30 MIN: CPT | Performed by: FAMILY MEDICINE

## 2025-08-05 PROCEDURE — 81003 URINALYSIS AUTO W/O SCOPE: CPT | Performed by: FAMILY MEDICINE

## 2025-08-05 RX ORDER — LAMOTRIGINE 25 MG/1
50 TABLET ORAL
COMMUNITY
Start: 2025-07-26

## 2025-08-05 RX ORDER — LAMOTRIGINE 150 MG/1
150 TABLET ORAL EVERY MORNING
COMMUNITY
Start: 2025-07-15

## 2025-08-05 SDOH — ECONOMIC STABILITY: FOOD INSECURITY: WITHIN THE PAST 12 MONTHS, YOU WORRIED THAT YOUR FOOD WOULD RUN OUT BEFORE YOU GOT MONEY TO BUY MORE.: NEVER TRUE

## 2025-08-05 SDOH — ECONOMIC STABILITY: FOOD INSECURITY: WITHIN THE PAST 12 MONTHS, THE FOOD YOU BOUGHT JUST DIDN'T LAST AND YOU DIDN'T HAVE MONEY TO GET MORE.: NEVER TRUE

## 2025-08-05 ASSESSMENT — ENCOUNTER SYMPTOMS
NAUSEA: 0
CONSTIPATION: 0
VOMITING: 0
COUGH: 0
SHORTNESS OF BREATH: 0
ABDOMINAL PAIN: 0
DIARRHEA: 0

## 2025-08-07 LAB
HCV AB SERPL QL IA: NORMAL
HCV IGG SERPL QL IA: NON REACTIVE S/CO RATIO

## 2025-08-08 LAB
C TRACH RRNA SPEC QL NAA+PROBE: NEGATIVE
N GONORRHOEA RRNA SPEC QL NAA+PROBE: NEGATIVE
SPECIMEN SOURCE: NORMAL
T VAGINALIS RRNA SPEC QL NAA+PROBE: NEGATIVE

## 2025-08-09 ENCOUNTER — RESULTS FOLLOW-UP (OUTPATIENT)
Dept: FAMILY MEDICINE CLINIC | Facility: CLINIC | Age: 30
End: 2025-08-09

## 2025-08-09 DIAGNOSIS — Z86.19 HX OF HERPES GENITALIS: ICD-10-CM

## 2025-08-09 DIAGNOSIS — R79.89 HIGH SERUM THYROID STIMULATING HORMONE (TSH): Primary | ICD-10-CM

## 2025-08-11 RX ORDER — VALACYCLOVIR HYDROCHLORIDE 1 G/1
1000 TABLET, FILM COATED ORAL DAILY
Qty: 90 TABLET | Refills: 1 | Status: SHIPPED | OUTPATIENT
Start: 2025-08-11 | End: 2025-08-12 | Stop reason: SDUPTHER

## 2025-08-12 DIAGNOSIS — Z86.19 HX OF HERPES GENITALIS: ICD-10-CM

## 2025-08-12 RX ORDER — VALACYCLOVIR HYDROCHLORIDE 1 G/1
1000 TABLET, FILM COATED ORAL DAILY
Qty: 90 TABLET | Refills: 1 | Status: SHIPPED | OUTPATIENT
Start: 2025-08-12 | End: 2026-02-08